# Patient Record
Sex: MALE | Race: WHITE
[De-identification: names, ages, dates, MRNs, and addresses within clinical notes are randomized per-mention and may not be internally consistent; named-entity substitution may affect disease eponyms.]

---

## 2018-03-09 ENCOUNTER — HOSPITAL ENCOUNTER (INPATIENT)
Dept: HOSPITAL 62 - ER | Age: 83
LOS: 2 days | Discharge: HOME HEALTH | DRG: 203 | End: 2018-03-11
Attending: INTERNAL MEDICINE | Admitting: INTERNAL MEDICINE
Payer: MEDICARE

## 2018-03-09 DIAGNOSIS — E11.9: ICD-10-CM

## 2018-03-09 DIAGNOSIS — E78.00: ICD-10-CM

## 2018-03-09 DIAGNOSIS — J21.9: Primary | ICD-10-CM

## 2018-03-09 DIAGNOSIS — D72.829: ICD-10-CM

## 2018-03-09 DIAGNOSIS — F17.210: ICD-10-CM

## 2018-03-09 DIAGNOSIS — Z79.84: ICD-10-CM

## 2018-03-09 DIAGNOSIS — Z79.899: ICD-10-CM

## 2018-03-09 DIAGNOSIS — G20: ICD-10-CM

## 2018-03-09 DIAGNOSIS — R26.81: ICD-10-CM

## 2018-03-09 DIAGNOSIS — E86.0: ICD-10-CM

## 2018-03-09 LAB
ADD MANUAL DIFF: NO
ANION GAP SERPL CALC-SCNC: 12 MMOL/L (ref 5–19)
BASOPHILS # BLD AUTO: 0 10^3/UL (ref 0–0.2)
BASOPHILS NFR BLD AUTO: 0.3 % (ref 0–2)
BUN SERPL-MCNC: 20 MG/DL (ref 7–20)
CALCIUM: 9.7 MG/DL (ref 8.4–10.2)
CHLORIDE SERPL-SCNC: 104 MMOL/L (ref 98–107)
CO2 SERPL-SCNC: 24 MMOL/L (ref 22–30)
EOSINOPHIL # BLD AUTO: 0 10^3/UL (ref 0–0.6)
EOSINOPHIL NFR BLD AUTO: 0.3 % (ref 0–6)
ERYTHROCYTE [DISTWIDTH] IN BLOOD BY AUTOMATED COUNT: 13.9 % (ref 11.5–14)
GLUCOSE SERPL-MCNC: 159 MG/DL (ref 75–110)
HCT VFR BLD CALC: 37.5 % (ref 37.9–51)
HGB BLD-MCNC: 12.7 G/DL (ref 13.5–17)
LYMPHOCYTES # BLD AUTO: 1.6 10^3/UL (ref 0.5–4.7)
LYMPHOCYTES NFR BLD AUTO: 9.3 % (ref 13–45)
MCH RBC QN AUTO: 32.6 PG (ref 27–33.4)
MCHC RBC AUTO-ENTMCNC: 34 G/DL (ref 32–36)
MCV RBC AUTO: 96 FL (ref 80–97)
MONOCYTES # BLD AUTO: 2.3 10^3/UL (ref 0.1–1.4)
MONOCYTES NFR BLD AUTO: 13.5 % (ref 3–13)
NEUTROPHILS # BLD AUTO: 13.1 10^3/UL (ref 1.7–8.2)
NEUTS SEG NFR BLD AUTO: 76.6 % (ref 42–78)
PLATELET # BLD: 430 10^3/UL (ref 150–450)
POTASSIUM SERPL-SCNC: 4.5 MMOL/L (ref 3.6–5)
RBC # BLD AUTO: 3.91 10^6/UL (ref 4.35–5.55)
SODIUM SERPL-SCNC: 139.8 MMOL/L (ref 137–145)
TOTAL CELLS COUNTED % (AUTO): 100 %
WBC # BLD AUTO: 17.1 10^3/UL (ref 4–10.5)

## 2018-03-09 PROCEDURE — 99285 EMERGENCY DEPT VISIT HI MDM: CPT

## 2018-03-09 PROCEDURE — 82962 GLUCOSE BLOOD TEST: CPT

## 2018-03-09 PROCEDURE — 80053 COMPREHEN METABOLIC PANEL: CPT

## 2018-03-09 PROCEDURE — 36415 COLL VENOUS BLD VENIPUNCTURE: CPT

## 2018-03-09 PROCEDURE — 85025 COMPLETE CBC W/AUTO DIFF WBC: CPT

## 2018-03-09 PROCEDURE — 93010 ELECTROCARDIOGRAM REPORT: CPT

## 2018-03-09 PROCEDURE — 85027 COMPLETE CBC AUTOMATED: CPT

## 2018-03-09 PROCEDURE — 96365 THER/PROPH/DIAG IV INF INIT: CPT

## 2018-03-09 PROCEDURE — 87040 BLOOD CULTURE FOR BACTERIA: CPT

## 2018-03-09 PROCEDURE — 83605 ASSAY OF LACTIC ACID: CPT

## 2018-03-09 PROCEDURE — 93005 ELECTROCARDIOGRAM TRACING: CPT

## 2018-03-09 PROCEDURE — 94640 AIRWAY INHALATION TREATMENT: CPT

## 2018-03-09 PROCEDURE — 83735 ASSAY OF MAGNESIUM: CPT

## 2018-03-09 PROCEDURE — 71046 X-RAY EXAM CHEST 2 VIEWS: CPT

## 2018-03-09 PROCEDURE — 81001 URINALYSIS AUTO W/SCOPE: CPT

## 2018-03-09 PROCEDURE — 80048 BASIC METABOLIC PNL TOTAL CA: CPT

## 2018-03-09 RX ADMIN — HEPARIN SODIUM SCH UNIT: 5000 INJECTION, SOLUTION INTRAVENOUS; SUBCUTANEOUS at 23:57

## 2018-03-09 NOTE — PDOC H&P
History of Present Illness


Admission Date/PCP: 


  





  REMI MACARIO MD





Patient complains of: Cold-like symptoms for 5 days.  Fever today per his wife.


History of Present Illness: 


QUIQUE LOW JR is a 87 year old male with history of parkinsonism and type 2 

diabetes mellitus was admitted with above-mentioned complaints.  Most of the 

history was obtained from his wife at bedside.  The patient apparently had cold-

like symptoms for the last 5 days and he spiked a fever today (between 101-102 

per wife).  He also has been having chills, runny nose and a productive cough 

with greenish sputum but no sore thoat, pleuritic chest pain and some shortness 

of breath.  But he denies any sick contact or  recent travels.  He is up-to-

date with his flu and pneumonia vaccines.





He apparently has chronic nausea but denies any vomiting or any abdominal pain 

or diarrhea.  He has chronic constipation for which he takes  Colace and 

MiraLAX.  He denies any urinary symptoms or any focal weakness but he feels 

generally weak. He is able to ambulate independently but has an unsteady gait. 





In the ED, his temperature was 98.8, heart rate 112, respiratory rate 20, blood 

pressure 122/75 with oxygen saturation of 92% on room air.  His WBC was 17.1 

with hemoglobin of 12.7.  A chest x-ray was done which was negative for any 

acute findings.  He received 500 mg IV Levaquin 1 and 75 mg Tamiflu 1.





Past Medical History


Medical History: Other - According to the patient's wife and based on previous 

records.


Cardiac Medical History: Reports: Hyperlipidema


Endocrine Medical History: Reports: Diabetes Mellitus Type 2


Malignancy Medical History: Reports: Brain Cancer - post resection, Lymphoma - 

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


Psychiatric Medical History: Reports: Other - Parkinsonism.





Past Surgical History


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 

lymph node removed due to lymphocytic lymphoma.





Social History


Smoking Status: Former Smoker


Cigarettes Packs Per Day: 0 - as a teenager.


Frequency of Alcohol Use: None


Hx Recreational Drug Use: No





Family History


Parental Family History Reviewed: Yes - maternal grandmother: DM2.


Children Family History Reviewed: No


Sibling(s) Family History Reviewed.: Yes





Medication/Allergy


Home Medications: 








Atorvastatin Calcium [Lipitor 10 mg Tablet] 10 mg PO QHS 03/09/18 


Metformin HCl [Glucophage 500 mg Tablet] 500 mg PO BID 03/09/18 








Allergies/Adverse Reactions: 


 





No Known Allergies Allergy (Unverified 03/09/18 17:04)


 











Review of Systems


ROS unobtainable: Other - Pertinent positives and negatives are as detailed in 

the HPI.





Physical Exam


Vital Signs: 


 











Temp Pulse Resp BP Pulse Ox


 


 98.8 F   112 H  20   122/75   92 


 


 03/09/18 17:33  03/09/18 17:33  03/09/18 17:33  03/09/18 17:33  03/09/18 17:33








 Intake & Output











 03/08/18 03/09/18 03/10/18





 06:59 06:59 06:59


 


Weight   72.2 kg











General appearance: PRESENT: no acute distress, well-developed


Head exam: PRESENT: atraumatic, normocephalic


Eye exam: PRESENT: conjunctiva pink, PERRLA.  ABSENT: scleral icterus


Mouth exam: PRESENT: moist, neck supple


Neck exam: PRESENT: full ROM.  ABSENT: JVD


Respiratory exam: PRESENT: decreased breath sounds, rhonchi.  ABSENT: rales, 

wheezes


Cardiovascular exam: PRESENT: RRR, +S1, +S2


Pulses: PRESENT: normal dorsalis pedis pul


GI/Abdominal exam: PRESENT: normal bowel sounds, soft.  ABSENT: distended, 

rebound, tenderness


Rectal exam: PRESENT: deferred


Extremities exam: PRESENT: full ROM.  ABSENT: pedal edema


Neurological exam: PRESENT: alert, altered, awake, motor sensory deficit - 

unsteady gait.


Skin exam: PRESENT: dry, warm.  ABSENT: erythema, rash





Results


Laboratory Results: 


 





 03/09/18 19:10 





 03/09/18 19:10 





 











  03/09/18 03/09/18 03/09/18





  19:10 19:10 19:10


 


WBC  17.1 H  


 


RBC  3.91 L  


 


Hgb  12.7 L  


 


Hct  37.5 L  


 


MCV  96  


 


MCH  32.6  


 


MCHC  34.0  


 


RDW  13.9  


 


Plt Count  430  


 


Seg Neutrophils %  76.6  


 


Lymphocytes %  9.3 L  


 


Monocytes %  13.5 H  


 


Eosinophils %  0.3  


 


Basophils %  0.3  


 


Absolute Neutrophils  13.1 H  


 


Absolute Lymphocytes  1.6  


 


Absolute Monocytes  2.3 H  


 


Absolute Eosinophils  0.0  


 


Absolute Basophils  0.0  


 


Sodium   139.8 


 


Potassium   4.5 


 


Chloride   104 


 


Carbon Dioxide   24 


 


Anion Gap   12 


 


BUN   20 


 


Creatinine   1.35 H 


 


Est GFR ( Amer)   > 60 


 


Est GFR (Non-Af Amer)   50 L 


 


Glucose   159 H 


 


Lactic Acid    1.0


 


Calcium   9.7 











EKG Comments: 


Twelve-lead EKG: Sinus rhythm, regular rate 110, axis -65, poor R-wave 

progression and no acute changes.  No previous 12-lead EKG to compare.





Impressions: 


 





Chest X-Ray  03/09/18 00:00


IMPRESSION:  No acute findings.


 














Assessment & Plan





- Diagnosis


(1) SIRS (systemic inflammatory response syndrome)


Is this a current diagnosis for this admission?: Yes   


Plan: 


Given fever and tachycardia with leukocytosis in the setting of acute 

bronchitis.  Chest x-ray was negative for any acute findings.  UA pending.  We 

will continue Levaquin in addition to Tamiflu since unable to obtain influenza 

screen in this facility and follow-up blood cultures.  








(2) Acute bronchiolitis


Qualifiers: 


   Bronchiolitis organism: unspecified organism   Qualified Code(s): J21.9 - 

Acute bronchiolitis, unspecified   


Is this a current diagnosis for this admission?: Yes   


Plan: 


management per #1. Albuterol nebs as needed and mucinex. 








(3) Type 2 diabetes mellitus


Qualifiers: 


   Diabetes mellitus long term insulin use: without long term use   Diabetes 

mellitus complication status: without complication   Qualified Code(s): E11.9 - 

Type 2 diabetes mellitus without complications   


Is this a current diagnosis for this admission?: No   


Plan: 


He is on metformin 500 mg twice a day per his wife.  Will start Humalog sliding 

scale while hospitalized.








(4) Unsteady gait


Is this a current diagnosis for this admission?: No   


Plan: 


History of parkinsonism, not on any medications per wife.  Will consult PT.








- Time


Time Spent: 50 to 70 Minutes


Anticipated discharge: Home





- Inpatient Certification


Based on my medical assessment, after consideration of the patient's 

comorbidities, presenting symptoms, or acuity I expect that the services needed 

warrant INPATIENT care.: Yes


I certify that my determination is in accordance with my understanding of 

Medicare's requirements for reasonable and necessary INPATIENT services [42 CFR 

412.3e].: Yes

## 2018-03-09 NOTE — ER DOCUMENT REPORT
ED General





- General


Chief Complaint: Respiratory Distress


Stated Complaint: COUGH,CONGESTION


Time Seen by Provider: 03/09/18 18:44


Notes: 





87-year-old male presents with feeling sick.  He states that he has been 

feeling rundown and weak for about 5 days also having cough productive of 

sputum and then slight blood.  Constant moderate.  No chest pain or shortness 

of breath.  No leg swelling.  No body aches.  Seen by his primary and sent here 

to rule out pneumonia.


TRAVEL OUTSIDE OF THE [place] IN LAST 30 DAYS: No





- Related Data


Allergies/Adverse Reactions: 


 





No Known Allergies Allergy (Unverified 03/09/18 17:04)


 











Past Medical History





- Social History


Smoking Status: Former Smoker


Chew tobacco use (# tins/day): No


Frequency of alcohol use: None


Drug Abuse: None


Family History: None


Patient has suicidal ideation: No


Patient has homicidal ideation: No





- Past Medical History


Cardiac Medical History: Reports: Hx Hypercholesterolemia


Endocrine Medical History: Reports: Hx Diabetes Mellitus Type 2


Renal/ Medical History: Denies: Hx Peritoneal Dialysis


Past Surgical History: Reports: Hx Neurologic Surgery - tumor removed





Review of Systems





- Review of Systems


Notes: 





REVIEW OF SYSTEMS





GEN: Denies fever, chills, weight loss


ENT: Denies sore throat, nasal discharge, ear pain


EYES: Denies blurry vision, eye pain, discharge


CV: Denies chest pain, palpitations, edema


RESP: Cough and hemoptysis


GI: Denies abdominal pain, nausea, vomiting, diarrhea


MSK: Denies joint pain/swelling, edema, 


SKIN: Denies rash, skin lesions


LYMPH: Denies swollen glands/lymph nodes


NEURO: Denies headache, focal weakness or numbness, dizziness


PSYCH: Denies depression, suicidal or homicidal ideation








PHYSICAL EXAMINATION





General: No acute distress, well-nourished


Head: Atraumatic, normocephalic


ENT: Mouth normal, oropharynx moist, no exudates or tonsillar enlargement


Eyes: Conjunctiva normal, pupils equal, lids normal


Neck: No JVD, supple, no guarding


CVS: Normal rate, regular rhythm, no murmurs


Resp: No resp distress, e right lower lobe rhonchi


GI: Nondistended, soft, no tenderness to palpation, no rebound or guarding


Ext: No deformities, no edema, normal range of motion in upper and lower ext


Back: No CVA or midline TTP


Skin: No rash, warm


Lymphatic: No lymphadeopathy noted


Neuro: Awake, alert.  Face symmetric.  GCS 15.





Physical Exam





- Vital signs


Vitals: 


 











Temp Pulse Resp BP Pulse Ox


 


 98.8 F   112 H  20   122/75   92 


 


 03/09/18 17:33  03/09/18 17:33  03/09/18 17:33  03/09/18 17:33  03/09/18 17:33














Course





- Re-evaluation


Re-evalutation: 





03/09/18 20:24


This is a well-appearing 87-year-old male presented with cough and weakness.  

He does not have shortness of breath or chest pain.  His oxygen saturation and 

pulmonary auscultation are normal.  He is tachycardic and has some adventitious 

sounds of the right lung.


Differential includes flu bronchitis less likely pneumonia.


Labs and x-ray were ordered at triage.  Will follow up on these.  I will likely 

give empiric Tamiflu since we cannot test for the flu.


Patient has been given antibiotics by triage physician which I think is 

warranted given that he is technically meets sepsis criteria.  I will give him 

a flu as well, IV fluids, and he will be admitted to the hospital.


Spoke with hospitalist.


03/09/18 20:51








- Vital Signs


Vital signs: 


 











Temp Pulse Resp BP Pulse Ox


 


 98.8 F   112 H  20   122/75   92 


 


 03/09/18 17:33  03/09/18 17:33  03/09/18 17:33  03/09/18 17:33  03/09/18 17:33














- Laboratory


Result Diagrams: 


 03/09/18 19:10





 03/09/18 19:10


Laboratory results interpreted by me: 


 











  03/09/18 03/09/18





  19:10 19:10


 


WBC  17.1 H 


 


RBC  3.91 L 


 


Hgb  12.7 L 


 


Hct  37.5 L 


 


Lymphocytes %  9.3 L 


 


Monocytes %  13.5 H 


 


Absolute Neutrophils  13.1 H 


 


Absolute Monocytes  2.3 H 


 


Creatinine   1.35 H


 


Est GFR (Non-Af Amer)   50 L


 


Glucose   159 H














Discharge





- Discharge


Clinical Impression: 


Pneumonia


Qualifiers:


 Pneumonia type: due to unspecified organism Laterality: right Lung location: 

unspecified part of lung Qualified Code(s): J18.9 - Pneumonia, unspecified 

organism





Condition: Fair


Disposition: ADMITTED AS INPATIENT


Admitting Provider: Hospitalist


Unit Admitted: Medical Floor


Referrals: 


REMI MACARIO MD [Primary Care Provider] - Follow up as needed

## 2018-03-09 NOTE — RADIOLOGY REPORT (SQ)
EXAM DESCRIPTION:  CHEST PA/LAT



COMPLETED DATE/TIME:  3/9/2018 5:40 pm



REASON FOR STUDY:  resp



COMPARISON:  None.



EXAM PARAMETERS:  NUMBER OF VIEWS: two views

TECHNIQUE: Digital Frontal and Lateral radiographic views of the chest acquired.

RADIATION DOSE: NA

LIMITATIONS: none



FINDINGS:  LUNGS AND PLEURA: No consolidation, masses or pneumothorax. No pleural effusion.

MEDIASTINUM AND HILAR STRUCTURES: Age-appropriate.

HEART AND VASCULAR STRUCTURES: Heart normal size.  No evidence for failure.

BONES: No acute findings.

HARDWARE: None in the chest.

OTHER: No other significant finding.



IMPRESSION:  No acute findings.



TECHNICAL DOCUMENTATION:  JOB ID:  [id number]

[id number]

 2011 [employer]- All Rights Reserved



Reading location - IP/workstation name: [employer]

## 2018-03-09 NOTE — EKG REPORT
SEVERITY:- ABNORMAL ECG -

SINUS TACHYCARDIA

LEFT ANTERIOR FASCICULAR BLOCK

BORDERLINE R WAVE PROGRESSION, ANTERIOR LEADS

NONSPECIFIC T ABNORMALITIES, LATERAL LEADS

:

Confirmed by: Francisca Monroy 09-Mar-2018 21:09:08

## 2018-03-09 NOTE — ER DOCUMENT REPORT
ED Medical Screen (RME)





- General


Chief Complaint: Respiratory Distress


Stated Complaint: COUGH,CONGESTION


Time Seen by Provider: 03/09/18 18:44


Notes: 





RME DISCLOSURE


I have seen this patient as part of a Rapid Medical Evaluation and, if 

applicable, placed any initially appropriate orders. The patient will be seen 

and fully evaluated, including a full history and physical exam, by a provider (

in Main ED or Fast Track) when a room becomes available.





------------------------------------------------------------------





87M here w c/o cough congestion SOB past 3-4 days. Cough is prod green sputum w 

some blood. Was sent over by PCP for further evaluation for possible pneumonia.





EXAM


Mildly tachycardic low 110s


Right lower lobe rhonchi


TRAVEL OUTSIDE OF THE [place] IN LAST 30 DAYS: No





- Related Data


Allergies/Adverse Reactions: 


 





No Known Allergies Allergy (Unverified 03/09/18 17:04)


 











Past Medical History





- Social History


Chew tobacco use (# tins/day): No


Frequency of alcohol use: None


Drug Abuse: None





- Past Medical History


Cardiac Medical History: Reports: Hx Hypercholesterolemia


Endocrine Medical History: Reports: Hx Diabetes Mellitus Type 2


Renal/ Medical History: Denies: Hx Peritoneal Dialysis


Past Surgical History: Reports: Hx Neurologic Surgery - tumor removed





Physical Exam





- Vital signs


Vitals: 





 











Temp Pulse Resp BP Pulse Ox


 


 98.8 F   112 H  20   122/75   92 


 


 03/09/18 17:33  03/09/18 17:33  03/09/18 17:33  03/09/18 17:33  03/09/18 17:33














Course





- Vital Signs


Vital signs: 





 











Temp Pulse Resp BP Pulse Ox


 


 98.8 F   112 H  20   122/75   92 


 


 03/09/18 17:33  03/09/18 17:33  03/09/18 17:33  03/09/18 17:33  03/09/18 17:33














Doctor's Discharge





- Discharge


Referrals: 


REMI MACARIO MD [Primary Care Provider] - Follow up as needed

## 2018-03-10 LAB
ANION GAP SERPL CALC-SCNC: 13 MMOL/L (ref 5–19)
APPEARANCE UR: CLEAR
APTT PPP: YELLOW S
BILIRUB UR QL STRIP: NEGATIVE
BUN SERPL-MCNC: 18 MG/DL (ref 7–20)
CALCIUM: 9 MG/DL (ref 8.4–10.2)
CHLORIDE SERPL-SCNC: 105 MMOL/L (ref 98–107)
CO2 SERPL-SCNC: 21 MMOL/L (ref 22–30)
ERYTHROCYTE [DISTWIDTH] IN BLOOD BY AUTOMATED COUNT: 14.1 % (ref 11.5–14)
GLUCOSE SERPL-MCNC: 129 MG/DL (ref 75–110)
GLUCOSE UR STRIP-MCNC: NEGATIVE MG/DL
HCT VFR BLD CALC: 33.2 % (ref 37.9–51)
HGB BLD-MCNC: 11.2 G/DL (ref 13.5–17)
KETONES UR STRIP-MCNC: NEGATIVE MG/DL
MCH RBC QN AUTO: 32.3 PG (ref 27–33.4)
MCHC RBC AUTO-ENTMCNC: 33.6 G/DL (ref 32–36)
MCV RBC AUTO: 96 FL (ref 80–97)
NITRITE UR QL STRIP: NEGATIVE
PH UR STRIP: 7 [PH] (ref 5–9)
PLATELET # BLD: 403 10^3/UL (ref 150–450)
POTASSIUM SERPL-SCNC: 4.3 MMOL/L (ref 3.6–5)
PROT UR STRIP-MCNC: NEGATIVE MG/DL
RBC # BLD AUTO: 3.46 10^6/UL (ref 4.35–5.55)
SODIUM SERPL-SCNC: 139.1 MMOL/L (ref 137–145)
SP GR UR STRIP: 1.01
UROBILINOGEN UR-MCNC: NEGATIVE MG/DL (ref ?–2)
WBC # BLD AUTO: 16.1 10^3/UL (ref 4–10.5)

## 2018-03-10 RX ADMIN — GUAIFENESIN SCH MG: 600 TABLET, EXTENDED RELEASE ORAL at 09:50

## 2018-03-10 RX ADMIN — HEPARIN SODIUM SCH UNIT: 5000 INJECTION, SOLUTION INTRAVENOUS; SUBCUTANEOUS at 06:20

## 2018-03-10 RX ADMIN — GUAIFENESIN SCH MG: 600 TABLET, EXTENDED RELEASE ORAL at 21:11

## 2018-03-10 RX ADMIN — IPRATROPIUM BROMIDE AND ALBUTEROL SULFATE SCH ML: 2.5; .5 SOLUTION RESPIRATORY (INHALATION) at 19:59

## 2018-03-10 RX ADMIN — INSULIN LISPRO PRN UNIT: 100 INJECTION, SOLUTION INTRAVENOUS; SUBCUTANEOUS at 13:23

## 2018-03-10 RX ADMIN — INSULIN LISPRO PRN UNIT: 100 INJECTION, SOLUTION INTRAVENOUS; SUBCUTANEOUS at 21:11

## 2018-03-10 RX ADMIN — IPRATROPIUM BROMIDE AND ALBUTEROL SULFATE SCH ML: 2.5; .5 SOLUTION RESPIRATORY (INHALATION) at 16:13

## 2018-03-10 RX ADMIN — METHYLPREDNISOLONE SODIUM SUCCINATE SCH MG: 40 INJECTION, POWDER, FOR SOLUTION INTRAMUSCULAR; INTRAVENOUS at 21:10

## 2018-03-10 RX ADMIN — HEPARIN SODIUM SCH UNIT: 5000 INJECTION, SOLUTION INTRAVENOUS; SUBCUTANEOUS at 13:19

## 2018-03-10 RX ADMIN — ACETAMINOPHEN PRN MG: 325 TABLET ORAL at 06:28

## 2018-03-10 RX ADMIN — HEPARIN SODIUM SCH UNIT: 5000 INJECTION, SOLUTION INTRAVENOUS; SUBCUTANEOUS at 21:18

## 2018-03-10 RX ADMIN — ACETAMINOPHEN PRN MG: 325 TABLET ORAL at 13:28

## 2018-03-10 RX ADMIN — ACETAMINOPHEN PRN MG: 325 TABLET ORAL at 21:00

## 2018-03-10 RX ADMIN — GUAIFENESIN SCH MG: 600 TABLET, EXTENDED RELEASE ORAL at 00:09

## 2018-03-10 NOTE — PDOC PROGRESS REPORT
Subjective


Progress Note for:: 03/10/18


Subjective:: 





Patient states that he feels weak and that he is still short of breath.  

Patient was not wearing oxygen at time of encounter.  Wife states the patient 

has been short of breath for the last few days.


Reason For Visit: 


ACUTE BRONCHITIS








Physical Exam


Vital Signs: 


 











Temp Pulse Resp BP Pulse Ox


 


 98.2 F   81   18   124/66   94 


 


 03/10/18 11:40  03/10/18 11:40  03/10/18 11:40  03/10/18 11:40  03/10/18 11:40








 Intake & Output











 03/09/18 03/10/18 03/11/18





 06:59 06:59 07:59


 


Intake Total  120 


 


Output Total  200 


 


Balance  -80 











General appearance: PRESENT: mild distress, well-developed, well-nourished


Head exam: PRESENT: atraumatic, normocephalic


Eye exam: PRESENT: conjunctiva pink, EOMI.  ABSENT: scleral icterus


Ear exam: PRESENT: normal external ear exam


Mouth exam: PRESENT: moist, tongue midline


Neck exam: ABSENT: carotid bruit, JVD, lymphadenopathy, thyromegaly


Respiratory exam: PRESENT: prolonged expiratory phas, wheezes.  ABSENT: rales


Cardiovascular exam: PRESENT: RRR.  ABSENT: diastolic murmur, rubs, systolic 

murmur


Pulses: PRESENT: normal dorsalis pedis pul


Vascular exam: PRESENT: normal capillary refill


GI/Abdominal exam: PRESENT: normal bowel sounds, soft.  ABSENT: distended, 

guarding, mass, organolmegaly, rebound, tenderness


Rectal exam: PRESENT: deferred


Extremities exam: PRESENT: full ROM.  ABSENT: calf tenderness, clubbing, pedal 

edema


Neurological exam: PRESENT: alert, awake, oriented to person, oriented to place

, oriented to time, oriented to situation, CN II-XII grossly intact.  ABSENT: 

motor sensory deficit


Psychiatric exam: PRESENT: appropriate affect, normal mood.  ABSENT: homicidal 

ideation, suicidal ideation


Skin exam: PRESENT: dry, intact, warm.  ABSENT: cyanosis, rash





Results


Laboratory Results: 


 





 03/10/18 04:43 





 03/10/18 04:43 





 











  03/10/18 03/10/18 03/10/18





  04:43 04:43 11:57


 


WBC  16.1 H  


 


RBC  3.46 L  


 


Hgb  11.2 L  


 


Hct  33.2 L  


 


MCV  96  


 


MCH  32.3  


 


MCHC  33.6  


 


RDW  14.1 H  


 


Plt Count  403  


 


Sodium   139.1 


 


Potassium   4.3 


 


Chloride   105 


 


Carbon Dioxide   21 L 


 


Anion Gap   13 


 


BUN   18 


 


Creatinine   1.22 


 


Est GFR ( Amer)   > 60 


 


Est GFR (Non-Af Amer)   56 L 


 


Glucose   129 H 


 


Calcium   9.0 


 


Urine Color    YELLOW


 


Urine Appearance    CLEAR


 


Urine pH    7.0


 


Ur Specific Gravity    1.006


 


Urine Protein    NEGATIVE


 


Urine Glucose (UA)    NEGATIVE


 


Urine Ketones    NEGATIVE


 


Urine Blood    NEGATIVE


 


Urine Nitrite    NEGATIVE


 


Ur Leukocyte Esterase    NEGATIVE


 


Urine WBC (Auto)    0


 


Urine RBC (Auto)    0











Impressions: 


 





Chest X-Ray  03/09/18 00:00


IMPRESSION:  No acute findings.


 














Assessment & Plan





- Diagnosis


(1) Acute respiratory distress


Is this a current diagnosis for this admission?: Yes   


Plan: 


Secondary to acute bronchitis: We will continue patient on breathing treatments

, steroids, and antibiotics.








(2) Acute bronchiolitis


Qualifiers: 


   Bronchiolitis organism: unspecified organism   Qualified Code(s): J21.9 - 

Acute bronchiolitis, unspecified   


Is this a current diagnosis for this admission?: Yes   


Plan: 


We will place patient on duo nebs, steroids, and Ceftin.  Will discontinue 

Levaquin.








(3) Leukocytosis


Is this a current diagnosis for this admission?: Yes   


Plan: 


We will continue to monitor.








(4) Debility


Is this a current diagnosis for this admission?: Yes   


Plan: 


PT/OT evaluation








(5) Dehydration


Is this a current diagnosis for this admission?: Yes   


Plan: 


We will place patient on maintenance fluids.








(6) SIRS (systemic inflammatory response syndrome)


Is this a current diagnosis for this admission?: Yes   


Plan: 


In setting of bronchitis: We will continue with current treatment.








- Time


Time Spent with patient: 15-24 minutes - Anticipate possible discharge in a.m. 

if patient has improved

## 2018-03-11 VITALS — DIASTOLIC BLOOD PRESSURE: 66 MMHG | SYSTOLIC BLOOD PRESSURE: 111 MMHG

## 2018-03-11 LAB
ABSOLUTE LYMPHOCYTES# (MANUAL): 0.8 10^3/UL (ref 0.5–4.7)
ABSOLUTE MONOCYTES # (MANUAL): 0.1 10^3/UL (ref 0.1–1.4)
ABSOLUTE NEUTROPHILS# (MANUAL): 10.4 10^3/UL (ref 1.7–8.2)
ACANTHOCYTES BLD QL SMEAR: (no result)
ADD MANUAL DIFF: YES
ALBUMIN SERPL-MCNC: 3.3 G/DL (ref 3.5–5)
ALP SERPL-CCNC: 82 U/L (ref 38–126)
ALT SERPL-CCNC: 149 U/L (ref 21–72)
ANION GAP SERPL CALC-SCNC: 12 MMOL/L (ref 5–19)
ANISOCYTOSIS BLD QL SMEAR: SLIGHT
AST SERPL-CCNC: 98 U/L (ref 17–59)
BASOPHILS NFR BLD MANUAL: 0 % (ref 0–2)
BILIRUB DIRECT SERPL-MCNC: 0.2 MG/DL (ref 0–0.4)
BILIRUB SERPL-MCNC: 0.2 MG/DL (ref 0.2–1.3)
BUN SERPL-MCNC: 18 MG/DL (ref 7–20)
CALCIUM: 9.3 MG/DL (ref 8.4–10.2)
CHLORIDE SERPL-SCNC: 105 MMOL/L (ref 98–107)
CO2 SERPL-SCNC: 23 MMOL/L (ref 22–30)
EOSINOPHIL NFR BLD MANUAL: 0 % (ref 0–6)
ERYTHROCYTE [DISTWIDTH] IN BLOOD BY AUTOMATED COUNT: 14.3 % (ref 11.5–14)
GLUCOSE SERPL-MCNC: 227 MG/DL (ref 75–110)
HCT VFR BLD CALC: 32.3 % (ref 37.9–51)
HGB BLD-MCNC: 10.9 G/DL (ref 13.5–17)
MCH RBC QN AUTO: 32.5 PG (ref 27–33.4)
MCHC RBC AUTO-ENTMCNC: 33.8 G/DL (ref 32–36)
MCV RBC AUTO: 96 FL (ref 80–97)
MONOCYTES % (MANUAL): 1 % (ref 3–13)
NEUTS BAND NFR BLD MANUAL: 2 % (ref 3–5)
PLATELET # BLD: 412 10^3/UL (ref 150–450)
PLATELET COMMENT: ADEQUATE
PLATELET GIANT: PRESENT
PLATELET LARGE: PRESENT
POIKILOCYTOSIS BLD QL SMEAR: (no result)
POTASSIUM SERPL-SCNC: 4.8 MMOL/L (ref 3.6–5)
PROT SERPL-MCNC: 5.9 G/DL (ref 6.3–8.2)
RBC # BLD AUTO: 3.36 10^6/UL (ref 4.35–5.55)
SCHISTOCYTES BLD QL SMEAR: (no result)
SEGMENTED NEUTROPHILS % (MAN): 90 % (ref 42–78)
SODIUM SERPL-SCNC: 139.5 MMOL/L (ref 137–145)
TOTAL CELLS COUNTED BLD: 100
TOXIC GRANULES BLD QL SMEAR: SLIGHT
VARIANT LYMPHS NFR BLD MANUAL: 7 % (ref 13–45)
WBC # BLD AUTO: 11.3 10^3/UL (ref 4–10.5)

## 2018-03-11 RX ADMIN — IPRATROPIUM BROMIDE AND ALBUTEROL SULFATE SCH ML: 2.5; .5 SOLUTION RESPIRATORY (INHALATION) at 08:34

## 2018-03-11 RX ADMIN — INSULIN LISPRO PRN UNIT: 100 INJECTION, SOLUTION INTRAVENOUS; SUBCUTANEOUS at 12:58

## 2018-03-11 RX ADMIN — HEPARIN SODIUM SCH UNIT: 5000 INJECTION, SOLUTION INTRAVENOUS; SUBCUTANEOUS at 13:43

## 2018-03-11 RX ADMIN — IPRATROPIUM BROMIDE AND ALBUTEROL SULFATE SCH ML: 2.5; .5 SOLUTION RESPIRATORY (INHALATION) at 13:25

## 2018-03-11 RX ADMIN — GUAIFENESIN SCH MG: 600 TABLET, EXTENDED RELEASE ORAL at 10:37

## 2018-03-11 RX ADMIN — METHYLPREDNISOLONE SODIUM SUCCINATE SCH MG: 40 INJECTION, POWDER, FOR SOLUTION INTRAMUSCULAR; INTRAVENOUS at 13:49

## 2018-03-11 RX ADMIN — METHYLPREDNISOLONE SODIUM SUCCINATE SCH MG: 40 INJECTION, POWDER, FOR SOLUTION INTRAMUSCULAR; INTRAVENOUS at 07:00

## 2018-03-11 RX ADMIN — INSULIN LISPRO PRN UNIT: 100 INJECTION, SOLUTION INTRAVENOUS; SUBCUTANEOUS at 10:37

## 2018-03-11 RX ADMIN — IPRATROPIUM BROMIDE AND ALBUTEROL SULFATE SCH ML: 2.5; .5 SOLUTION RESPIRATORY (INHALATION) at 00:01

## 2018-03-11 RX ADMIN — IPRATROPIUM BROMIDE AND ALBUTEROL SULFATE SCH ML: 2.5; .5 SOLUTION RESPIRATORY (INHALATION) at 03:15

## 2018-03-11 RX ADMIN — HEPARIN SODIUM SCH UNIT: 5000 INJECTION, SOLUTION INTRAVENOUS; SUBCUTANEOUS at 06:35

## 2018-03-11 NOTE — PDOC DISCHARGE SUMMARY
General





- Admit/Disc Date/PCP


Admission Date/Primary Care Provider: 


  03/09/18 21:19





  REMI MACARIO MD





Discharge Date: 03/11/18





- Discharge Diagnosis


(1) Acute respiratory distress


Is this a current diagnosis for this admission?: Yes   


Summary: 


Secondary to Acute Bronchitis:  patient on steroids, breathing treatments, and 

antibiotics.  Patient has demonstrated significant improvement.








(2) Acute bronchiolitis


Is this a current diagnosis for this admission?: Yes   


Summary: 


Continue breathing treatment steroids and Ceftin








(3) Leukocytosis


Is this a current diagnosis for this admission?: Yes   


Summary: 


Resolving. 








(4) Debility


Is this a current diagnosis for this admission?: Yes   


Summary: 


Patient has been up ambulating around room and hallway.  Will order for home 

health.








(5) Dehydration


Is this a current diagnosis for this admission?: Yes   


Summary: 


Resolved.








(6) SIRS (systemic inflammatory response syndrome)


Is this a current diagnosis for this admission?: Yes   


Summary: 


Secondary to acute bronchitis: Resolved








- Additional Information


Resuscitation Status: Full Code


Discharge Diet: Cardiac, Diabetic


Discharge Activity: Activity As Tolerated


Prescriptions: 


Albuterol Sulfate [Proair HFA Inhalation Aerosol 8.5 gm MDI] 2 puff IH Q4 PRN #

1 mdi


 PRN Reason: 


Alcaftadine [Lastacaft Drops] 1 drop OP DAILY #1 bottle


Cefuroxime Axetil [Ceftin 500 mg Tablet] 500 mg PO Q12A #8 tablet


Methylprednisolone [Medrol Dosepack (4 mg/Tab) 21 Tab/Dosepak] 4 mg PO ASDIR 

PRN #21 tab.ds.pk


 PRN Reason: 


Home Medications: 








Atorvastatin Calcium [Lipitor 10 mg Tablet] 10 mg PO QHS 03/09/18 


Metformin HCl [Glucophage 500 mg Tablet] 500 mg PO BID 03/09/18 


Albuterol Sulfate [Proair HFA Inhalation Aerosol 8.5 gm MDI] 2 puff IH Q4 PRN #

1 mdi 03/11/18 


Alcaftadine [Lastacaft Drops] 1 drop OP DAILY #1 bottle 03/11/18 


Cefuroxime Axetil [Ceftin 500 mg Tablet] 500 mg PO Q12A #8 tablet 03/11/18 


Methylprednisolone [Medrol Dosepack (4 mg/Tab) 21 Tab/Dosepak] 4 mg PO ASDIR 

PRN #21 tab.ds.pk 03/11/18 











History of Present Illness


Patient complains of: Cold-like symptoms for 5 days


History of Present Illness: 


QUIQUE LOW JR is a 87 year old male since the hospital with cold-like 

symptoms for 5 days.








Hospital Course


Hospital Course: 





Patient is a 87-year-old gentleman that was admitted to our facility for cold-

like symptoms.  Patient was found to be wheezing on exam.  Therefore patient 

was admitted for acute bronchitis.  Patient was placed on steroids, breathing 

treatments, and antibiotics.  Patient's respiratory function improved and was 

not requiring oxygen at time of discharge home.  Patient was noted to have 

elevated glucose secondary to steroids.  Wife is toe to monitor glucose closely 

at home and if problem persist to call PCP.





Physical Exam


Vital Signs: 


 











Temp Pulse Resp BP Pulse Ox


 


 98.1 F   98   20   111/66   94 


 


 03/11/18 12:08  03/11/18 12:08  03/11/18 12:08  03/11/18 12:08  03/11/18 12:08








 Intake & Output











 03/10/18 03/11/18 03/12/18





 05:59 06:59 06:59


 


Intake Total   


 


Output Total   


 


Balance   











General appearance: PRESENT: no acute distress, well-developed, well-nourished


Head exam: PRESENT: atraumatic, normocephalic


Eye exam: PRESENT: conjunctival injection


Ear exam: PRESENT: normal external ear exam


Mouth exam: PRESENT: moist, tongue midline


Neck exam: ABSENT: carotid bruit, JVD, lymphadenopathy, thyromegaly


Respiratory exam: PRESENT: clear to auscultation chucho.  ABSENT: rales, rhonchi, 

wheezes


Cardiovascular exam: PRESENT: RRR.  ABSENT: diastolic murmur, rubs, systolic 

murmur


Pulses: PRESENT: normal dorsalis pedis pul


Vascular exam: PRESENT: normal capillary refill


GI/Abdominal exam: PRESENT: normal bowel sounds, soft.  ABSENT: distended, 

guarding, mass, organolmegaly, rebound, tenderness


Rectal exam: PRESENT: deferred


Extremities exam: PRESENT: full ROM.  ABSENT: calf tenderness, clubbing, pedal 

edema


Musculoskeletal exam: PRESENT: full ROM


Neurological exam: PRESENT: alert, awake, oriented to person, oriented to place

, oriented to time, oriented to situation, CN II-XII grossly intact.  ABSENT: 

motor sensory deficit


Psychiatric exam: PRESENT: appropriate affect, normal mood.  ABSENT: homicidal 

ideation, suicidal ideation


Skin exam: PRESENT: dry, intact, warm.  ABSENT: cyanosis, rash





Results


Laboratory Results: 


 





 03/11/18 04:56 





 03/11/18 04:56 





 











  03/10/18 03/11/18 03/11/18





  11:57 04:56 04:56


 


WBC   11.3 H 


 


RBC   3.36 L 


 


Hgb   10.9 L 


 


Hct   32.3 L 


 


MCV   96 


 


MCH   32.5 


 


MCHC   33.8 


 


RDW   14.3 H 


 


Plt Count   412 


 


Seg Neutrophils %   Not Reportable 


 


Lymphocytes %   Not Reportable 


 


Monocytes %   Not Reportable 


 


Eosinophils %   Not Reportable 


 


Basophils %   Not Reportable 


 


Absolute Neutrophils   Not Reportable 


 


Absolute Lymphocytes   Not Reportable 


 


Absolute Monocytes   Not Reportable 


 


Absolute Eosinophils   Not Reportable 


 


Absolute Basophils   Not Reportable 


 


Sodium    139.5


 


Potassium    4.8


 


Chloride    105


 


Carbon Dioxide    23


 


Anion Gap    12


 


BUN    18


 


Creatinine    1.24


 


Est GFR ( Amer)    > 60


 


Est GFR (Non-Af Amer)    55 L


 


Glucose    227 H


 


Calcium    9.3


 


Magnesium    2.2


 


Total Bilirubin    0.2


 


AST    98 H


 


ALT    149 H


 


Alkaline Phosphatase    82


 


Total Protein    5.9 L


 


Albumin    3.3 L


 


Urine Color  YELLOW  


 


Urine Appearance  CLEAR  


 


Urine pH  7.0  


 


Ur Specific Gravity  1.006  


 


Urine Protein  NEGATIVE  


 


Urine Glucose (UA)  NEGATIVE  


 


Urine Ketones  NEGATIVE  


 


Urine Blood  NEGATIVE  


 


Urine Nitrite  NEGATIVE  


 


Ur Leukocyte Esterase  NEGATIVE  


 


Urine WBC (Auto)  0  


 


Urine RBC (Auto)  0  











Impressions: 


 





Chest X-Ray  03/09/18 00:00


IMPRESSION:  No acute findings.


 














Qualifiers





- *


**PATEINT BEING DISCHARGED WITH ANY OF THE FOLLOWING DIAGNOSIS?: No





Plan


Time Spent: Greater than 30 Minutes